# Patient Record
Sex: MALE | Race: WHITE | NOT HISPANIC OR LATINO
[De-identification: names, ages, dates, MRNs, and addresses within clinical notes are randomized per-mention and may not be internally consistent; named-entity substitution may affect disease eponyms.]

---

## 2018-02-08 PROBLEM — Z00.00 ENCOUNTER FOR PREVENTIVE HEALTH EXAMINATION: Status: ACTIVE | Noted: 2018-02-08

## 2018-02-23 ENCOUNTER — APPOINTMENT (OUTPATIENT)
Dept: HEART AND VASCULAR | Facility: CLINIC | Age: 28
End: 2018-02-23
Payer: COMMERCIAL

## 2018-02-23 DIAGNOSIS — M25.639 STIFFNESS OF UNSPECIFIED WRIST, NOT ELSEWHERE CLASSIFIED: ICD-10-CM

## 2018-02-23 PROCEDURE — 99204 OFFICE O/P NEW MOD 45 MIN: CPT

## 2018-02-26 PROBLEM — M25.639 DECREASED ROM OF WRIST: Status: ACTIVE | Noted: 2018-02-26

## 2018-04-03 VITALS
DIASTOLIC BLOOD PRESSURE: 89 MMHG | HEIGHT: 69 IN | RESPIRATION RATE: 16 BRPM | SYSTOLIC BLOOD PRESSURE: 142 MMHG | OXYGEN SATURATION: 98 % | WEIGHT: 175.05 LBS | TEMPERATURE: 98 F

## 2018-04-03 NOTE — H&P ADULT - ASSESSMENT
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 Mckee at [facility] in 1992 with multiple episodes of recurrent cellulitis, most recently in 12/2017 and was referred to Dr. Coleman for further evaluation.    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         ASA: III and Mallampati: III      - IVF Hydration NS @ 75cc/hr.

## 2018-04-03 NOTE — H&P ADULT - HISTORY OF PRESENT ILLNESS
***HISTORY OBTAINED FROM PATIENT AND DR. 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 Mckee at [facility] in 1992 with multiple episodes of recurrent cellulitis, most recently in 12/2017 and was referred to Dr. Braden for further evaluation.  Pt states he is left handed and was doing reasonably well for number of years but over the last year he has had multiple episodes of cellulitis requiring hospitalization with IV antibiotics. He had a recent MRI performed at [facility] (results pending).  Pt reports "mild" chronic pain R arm pain, diffuse swelling of the forearm extending to the wrist, and some limitation of flexion of the wrist.    When seen in the office pt was found to have extensive scarring from previous surgical resections, but no overlying skin lesions or birthmarks.  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 ***HISTORY OBTAINED FROM PATIENT AND DR. 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 Mckee at [facility] in 1992 with multiple episodes of recurrent cellulitis, most recently in 12/2017 and was referred to Dr. Braden for further evaluation.  Pt states he has been doing reasonably well for number of years, however over the last year he has had multiple episodes of cellulitis requiring hospitalization with IV antibiotics. He had a recent MRI performed at [facility] (results pending).  Pt reports "mild" chronic pain R arm pain, diffuse swelling of the forearm extending to the wrist, and some limitation of flexion of the wrist.    When seen in the office pt was found to have extensive scarring from previous surgical resections, but no overlying skin lesions or birthmarks.  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 ***HISTORY OBTAINED FROM PATIENT AND DR. 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 Mckee at [facility] in 1992 with multiple episodes of recurrent cellulitis, most recently in 12/2017 and was referred to Dr. Braden for further evaluation.  Pt states he has been doing reasonably well for number of years, however over the last year he has had multiple episodes of cellulitis requiring hospitalization with IV antibiotics. He had a recent MRI performed at [facility] (results pending).  Pt reports "mild" chronic pain R arm pain, diffuse swelling of the forearm extending to the wrist, and some limitation of flexion of the wrist.    When seen in the office pt was found to have extensive scarring from previous surgical resections, but no overlying skin lesions or birthmarks.  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

## 2018-04-03 NOTE — H&P ADULT - EXTREMITIES COMMENTS
+ Diffuse swelling of RUE extending to the hand. N/V intact.  Scar from prior surgeries.  Overlying skin remains intact. + Diffuse swelling of RUE extending to the hand. N/V intact.  Scarring from prior surgeries.  Overlying skin remains intact.

## 2018-04-05 ENCOUNTER — INPATIENT (INPATIENT)
Facility: HOSPITAL | Age: 28
LOS: 0 days | Discharge: HOME | DRG: 804 | End: 2018-04-05
Attending: RADIOLOGY | Admitting: RADIOLOGY
Payer: COMMERCIAL

## 2018-04-05 ENCOUNTER — TRANSCRIPTION ENCOUNTER (OUTPATIENT)
Age: 28
End: 2018-04-05

## 2018-04-05 VITALS — TEMPERATURE: 98 F

## 2018-04-05 LAB
ANION GAP SERPL CALC-SCNC: 15 MMOL/L — SIGNIFICANT CHANGE UP (ref 5–17)
APTT BLD: 36 SEC — SIGNIFICANT CHANGE UP (ref 27.5–37.4)
BASOPHILS NFR BLD AUTO: 0.8 % — SIGNIFICANT CHANGE UP (ref 0–2)
BUN SERPL-MCNC: 14 MG/DL — SIGNIFICANT CHANGE UP (ref 7–23)
CALCIUM SERPL-MCNC: 9.5 MG/DL — SIGNIFICANT CHANGE UP (ref 8.4–10.5)
CHLORIDE SERPL-SCNC: 103 MMOL/L — SIGNIFICANT CHANGE UP (ref 96–108)
CO2 SERPL-SCNC: 23 MMOL/L — SIGNIFICANT CHANGE UP (ref 22–31)
CREAT SERPL-MCNC: 0.75 MG/DL — SIGNIFICANT CHANGE UP (ref 0.5–1.3)
EOSINOPHIL NFR BLD AUTO: 1.3 % — SIGNIFICANT CHANGE UP (ref 0–6)
GLUCOSE SERPL-MCNC: 98 MG/DL — SIGNIFICANT CHANGE UP (ref 70–99)
HCT VFR BLD CALC: 47.2 % — SIGNIFICANT CHANGE UP (ref 39–50)
HGB BLD-MCNC: 16.5 G/DL — SIGNIFICANT CHANGE UP (ref 13–17)
INR BLD: 1.05 — SIGNIFICANT CHANGE UP (ref 0.88–1.16)
LYMPHOCYTES # BLD AUTO: 40.7 % — SIGNIFICANT CHANGE UP (ref 13–44)
MCHC RBC-ENTMCNC: 31 PG — SIGNIFICANT CHANGE UP (ref 27–34)
MCHC RBC-ENTMCNC: 35 G/DL — SIGNIFICANT CHANGE UP (ref 32–36)
MCV RBC AUTO: 88.7 FL — SIGNIFICANT CHANGE UP (ref 80–100)
MONOCYTES NFR BLD AUTO: 12.3 % — SIGNIFICANT CHANGE UP (ref 2–14)
NEUTROPHILS NFR BLD AUTO: 44.9 % — SIGNIFICANT CHANGE UP (ref 43–77)
PLATELET # BLD AUTO: 202 K/UL — SIGNIFICANT CHANGE UP (ref 150–400)
POTASSIUM SERPL-MCNC: 4.2 MMOL/L — SIGNIFICANT CHANGE UP (ref 3.5–5.3)
POTASSIUM SERPL-SCNC: 4.2 MMOL/L — SIGNIFICANT CHANGE UP (ref 3.5–5.3)
PROTHROM AB SERPL-ACNC: 11.7 SEC — SIGNIFICANT CHANGE UP (ref 9.8–12.7)
RBC # BLD: 5.32 M/UL — SIGNIFICANT CHANGE UP (ref 4.2–5.8)
RBC # FLD: 11.9 % — SIGNIFICANT CHANGE UP (ref 10.3–16.9)
SODIUM SERPL-SCNC: 141 MMOL/L — SIGNIFICANT CHANGE UP (ref 135–145)
WBC # BLD: 6 K/UL — SIGNIFICANT CHANGE UP (ref 3.8–10.5)
WBC # FLD AUTO: 6 K/UL — SIGNIFICANT CHANGE UP (ref 3.8–10.5)

## 2018-04-05 PROCEDURE — 76937 US GUIDE VASCULAR ACCESS: CPT | Mod: 26

## 2018-04-05 PROCEDURE — 85025 COMPLETE CBC W/AUTO DIFF WBC: CPT

## 2018-04-05 PROCEDURE — 93005 ELECTROCARDIOGRAM TRACING: CPT

## 2018-04-05 PROCEDURE — 85610 PROTHROMBIN TIME: CPT

## 2018-04-05 PROCEDURE — 93010 ELECTROCARDIOGRAM REPORT: CPT

## 2018-04-05 PROCEDURE — 80048 BASIC METABOLIC PNL TOTAL CA: CPT

## 2018-04-05 PROCEDURE — C1889: CPT

## 2018-04-05 PROCEDURE — 85730 THROMBOPLASTIN TIME PARTIAL: CPT

## 2018-04-05 PROCEDURE — 37244 VASC EMBOLIZE/OCCLUDE BLEED: CPT

## 2018-04-05 RX ORDER — CEFAZOLIN SODIUM 1 G
1000 VIAL (EA) INJECTION EVERY 8 HOURS
Qty: 0 | Refills: 0 | Status: DISCONTINUED | OUTPATIENT
Start: 2018-04-05 | End: 2018-04-05

## 2018-04-05 RX ORDER — ONDANSETRON 8 MG/1
4 TABLET, FILM COATED ORAL EVERY 4 HOURS
Qty: 0 | Refills: 0 | Status: DISCONTINUED | OUTPATIENT
Start: 2018-04-05 | End: 2018-04-05

## 2018-04-05 RX ORDER — SODIUM CHLORIDE 9 MG/ML
500 INJECTION INTRAMUSCULAR; INTRAVENOUS; SUBCUTANEOUS
Qty: 0 | Refills: 0 | Status: DISCONTINUED | OUTPATIENT
Start: 2018-04-05 | End: 2018-04-05

## 2018-04-05 RX ORDER — MORPHINE SULFATE 50 MG/1
4 CAPSULE, EXTENDED RELEASE ORAL EVERY 4 HOURS
Qty: 0 | Refills: 0 | Status: DISCONTINUED | OUTPATIENT
Start: 2018-04-05 | End: 2018-04-05

## 2018-04-05 RX ORDER — CEPHALEXIN 500 MG
1 CAPSULE ORAL
Qty: 28 | Refills: 0 | OUTPATIENT
Start: 2018-04-05 | End: 2018-04-11

## 2018-04-05 RX ADMIN — Medication 100 MILLIGRAM(S): at 18:14

## 2018-04-05 RX ADMIN — SODIUM CHLORIDE 75 MILLILITER(S): 9 INJECTION INTRAMUSCULAR; INTRAVENOUS; SUBCUTANEOUS at 14:33

## 2018-04-05 RX ADMIN — Medication 130 MILLIGRAM(S): at 14:33

## 2018-04-05 NOTE — DISCHARGE NOTE ADULT - CARE PROVIDER_API CALL
Rodrigo Coleman (MD), Diagnostic Radiology  [address]  [address] [place] [address]  Phone: [phone]  Fax: [phone]

## 2018-04-05 NOTE — CHART NOTE - NSCHARTNOTEFT_GEN_A_CORE
Brief Operative Report  ONELIA LEE, 27y, Male  MRN:  [id number]    Procedure Date:  4/5/2018    Primary Surgeon:  Jared  Co-Surgeon(s):    Assistants:  Cesario Epps    Type of Anesthesia:  LMA anesthesia    Elective Procedure?  Y/N:  Yes    Pre-op Diagnosis:  Lymphatic malformation of R arm  Post-op Diagnosis:  Lymphatic malformation of R arm    Procedure:    - Direct stick embolization of lymphatic malformation of R distal forearm    Operative Findings:    - Direct stick study of lateral and medial aspects of R wrist and distal forearm  - demonstrates diffuse macro and microcystic lymphatic malformation extending across wrist bw distal radius and dontae  - DSE performed with 170 mg of doxycycline  - entry tracts occluded with Surgiflo collagen matrix    Specimens:  none   Drains:  none    EBL:  minimal    Antibiotic Protocol:  Followed Protocol:      Comments/Other Details:    - none    Condition Post-op:  stable  Disposition:  recovery room

## 2018-04-05 NOTE — DISCHARGE NOTE ADULT - PLAN OF CARE
You had a direct stick embolization for your right arm. Take Keflex (Cephalexin) 500mg four times a day for 7 days. This is an antibiotic to avoid infection.  Follow up with Dr. Coleman in 6 weeks. Continue home medications for ADD.

## 2018-04-05 NOTE — DISCHARGE NOTE ADULT - MEDICATION SUMMARY - MEDICATIONS TO TAKE
I will START or STAY ON the medications listed below when I get home from the hospital:    Xanax 0.5 mg oral tablet  -- 1 tab(s) by mouth 3 times a day, As Needed  -- Indication: For Anxiety    Adderall XR 15 mg oral capsule, extended release  -- 1 cap(s) by mouth once a day (in the morning), As Needed  -- Indication: For Anxiety I will START or STAY ON the medications listed below when I get home from the hospital:    Xanax 0.5 mg oral tablet  -- 1 tab(s) by mouth 3 times a day, As Needed  -- Indication: For Anxiety    Keflex 500 mg oral capsule  -- 1 cap(s) by mouth every 6 hours   -- Finish all this medication unless otherwise directed by prescriber.    -- Indication: For Antibiotics    Adderall XR 15 mg oral capsule, extended release  -- 1 cap(s) by mouth once a day (in the morning), As Needed  -- Indication: For Anxiety

## 2018-04-05 NOTE — DISCHARGE NOTE ADULT - HOSPITAL COURSE
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 Mckee at [facility] in 1992 with multiple episodes of recurrent cellulitis, most recently in 12/2017 and was referred to Dr. Coleman for further evaluation.  Pt states he has been doing reasonably well for number of years, however over the last year he has had multiple episodes of cellulitis requiring hospitalization with IV antibiotics. He had a recent MRI performed at [facility] (results pending).  Pt reports "mild" chronic pain R arm pain, diffuse swelling of the forearm extending to the wrist, and some limitation of flexion of the wrist.  Pt now s/p right upper arm lymphatic malformation embolization. Pt stable for discharge as per Dr. Coleman. Pt will go on 1 week of Keflex 500mg four times a day. Prescription sent to his pharmacy. Pt will follow up with Dr. Coleman in 6 weeks.

## 2018-04-05 NOTE — DISCHARGE NOTE ADULT - PATIENT PORTAL LINK FT
You can access the [facility] Patient Portal, offered by [facility], by registering with the following website: [email or web address]

## 2018-04-05 NOTE — DISCHARGE NOTE ADULT - CARE PLAN
care per cardiology Principal Discharge DX:	Lymphatic malformation  Goal:	You had a direct stick embolization for your right arm.  Assessment and plan of treatment:	Take Keflex (Cephalexin) 500mg four times a day for 7 days. This is an antibiotic to avoid infection.  Follow up with Dr. Coleman in 6 weeks.  Secondary Diagnosis:	ADD (attention deficit disorder)  Goal:	Continue home medications for ADD.

## 2018-04-10 DIAGNOSIS — F98.8 OTHER SPECIFIED BEHAVIORAL AND EMOTIONAL DISORDERS WITH ONSET USUALLY OCCURRING IN CHILDHOOD AND ADOLESCENCE: ICD-10-CM

## 2018-04-10 DIAGNOSIS — I89.9 NONINFECTIVE DISORDER OF LYMPHATIC VESSELS AND LYMPH NODES, UNSPECIFIED: ICD-10-CM

## 2018-06-21 PROBLEM — F98.8 OTHER SPECIFIED BEHAVIORAL AND EMOTIONAL DISORDERS WITH ONSET USUALLY OCCURRING IN CHILDHOOD AND ADOLESCENCE: Chronic | Status: ACTIVE | Noted: 2018-04-03

## 2018-06-21 PROBLEM — Q89.9 CONGENITAL MALFORMATION, UNSPECIFIED: Chronic | Status: ACTIVE | Noted: 2018-04-03

## 2018-06-22 ENCOUNTER — APPOINTMENT (OUTPATIENT)
Dept: HEART AND VASCULAR | Facility: CLINIC | Age: 28
End: 2018-06-22
Payer: COMMERCIAL

## 2018-06-22 PROCEDURE — 76882 US LMTD JT/FCL EVL NVASC XTR: CPT

## 2018-06-22 PROCEDURE — 99214 OFFICE O/P EST MOD 30 MIN: CPT | Mod: 25

## 2019-02-11 ENCOUNTER — APPOINTMENT (OUTPATIENT)
Dept: HEART AND VASCULAR | Facility: CLINIC | Age: 29
End: 2019-02-11
Payer: COMMERCIAL

## 2019-02-11 PROCEDURE — 76882 US LMTD JT/FCL EVL NVASC XTR: CPT

## 2019-02-11 PROCEDURE — 99213 OFFICE O/P EST LOW 20 MIN: CPT | Mod: 25

## 2019-02-13 NOTE — PHYSICAL EXAM
[Alert] : alert [No Acute Distress] : no acute distress [PERRL] : pupils equal, round and reactive to light [Normal Hearing] : hearing was normal [No Neck Mass] : no neck mass was observed [No Respiratory Distress] : no respiratory distress [Normal Rate] : heart rate was normal  [Regular Rhythm] : with a regular rhythm [Not Tender] : non-tender [Not Distended] : not distended [Normal Gait] : normal gait [No Rash] : no rash [No Skin Lesions] : no skin lesions [No Motor Deficits] : the motor exam was normal [No Sensory Deficits] : the sensory exam was normal to light touch and pinprick [Oriented x3] : oriented to person, place, and time [de-identified] : normal radial pulse bilaterally, swelling of right forearm [de-identified] : decreased ROM of left wrist

## 2019-02-13 NOTE — ASSESSMENT
[FreeTextEntry1] : This is a 28-year-old male with a known fairly extensive primarily  lymphatic malformation in his right wrist and forearm. He has had multiple surgeries and treatments and we saw him for the first time about a year ago for frequent recurrent cellulitis of the distal forearm and wrist. We treated the area with sclerotherapy he has done well with no inflammatory episodes since. He said he was working out in the gym last week and when he got home he noticed a firm lump over the volar aspect of the mid forearm. It is not painful and has had no fever or any inflammatory changes. On physical exam there is an approximately 1 cm slightly bluish firm subcutaneous lump which is nontender. I did an ultrasound in the office and it appears to be a partially thrombosed vein. I told him that often these lymphatic malformations are mixed with venous elements and it appeared he had a localized area of spontaneous thrombosis. I told him it probably would resolve on its own and if it wasn't causing any symptoms it would not be worth treating in any event. I told him if he had any questions or there were any changes he should come back for a followup visit.

## 2019-04-01 ENCOUNTER — APPOINTMENT (OUTPATIENT)
Dept: HEART AND VASCULAR | Facility: CLINIC | Age: 29
End: 2019-04-01

## 2019-04-05 ENCOUNTER — APPOINTMENT (OUTPATIENT)
Dept: HEART AND VASCULAR | Facility: CLINIC | Age: 29
End: 2019-04-05
Payer: COMMERCIAL

## 2019-04-05 PROCEDURE — 99214 OFFICE O/P EST MOD 30 MIN: CPT | Mod: 25

## 2019-04-05 PROCEDURE — 76882 US LMTD JT/FCL EVL NVASC XTR: CPT

## 2019-04-09 NOTE — ASSESSMENT
[FreeTextEntry1] : This is a 28-year-old male with a diffuse lymphatic malformation involving the right distal forearm and wrist. He had surgery years ago and more recently had been having multiple episodes of swelling, pain and cellulitis requiring IV antibiotics. We've done one direct sclerotherapy treatment using doxycycline approximately one year ago. He's been doing fairly well with no inflammatory or infectious episodes. He does note that over the last couple of months the wrist and forearm seem more tensely swollen but not frankly painful. On physical exam he has scarring from previous surgery and there is a fair amount of tense swelling especially in the volar aspect of the wrist. Neuromuscular function is intact in the hand. I did an ultrasound in the office confirming significant residual macrocystic malformation. He is scheduled to travel to [facility] for 10 days nert week and he will wait until he returns to have a procedure, which I do recommend. We also gave him a prescription for prophylactic antibiotics in case there were any issues while he was traveling.

## 2019-04-09 NOTE — PHYSICAL EXAM
[Alert] : alert [PERRL] : pupils equal, round and reactive to light [No Acute Distress] : no acute distress [No Neck Mass] : no neck mass was observed [Normal Hearing] : hearing was normal [No Respiratory Distress] : no respiratory distress [Normal Rate] : heart rate was normal  [Not Tender] : non-tender [Not Distended] : not distended [Regular Rhythm] : with a regular rhythm [Normal Gait] : normal gait [No Rash] : no rash [No Motor Deficits] : the motor exam was normal [No Skin Lesions] : no skin lesions [No Sensory Deficits] : the sensory exam was normal to light touch and pinprick [Oriented x3] : oriented to person, place, and time [de-identified] : normal radial pulse bilaterally, swelling of right wrist [de-identified] : decreased ROM of left wrist

## 2019-04-24 ENCOUNTER — TRANSCRIPTION ENCOUNTER (OUTPATIENT)
Age: 29
End: 2019-04-24

## 2019-06-24 VITALS
TEMPERATURE: 97 F | DIASTOLIC BLOOD PRESSURE: 82 MMHG | SYSTOLIC BLOOD PRESSURE: 136 MMHG | HEART RATE: 62 BPM | RESPIRATION RATE: 16 BRPM | OXYGEN SATURATION: 96 %

## 2019-06-24 RX ORDER — CHLORHEXIDINE GLUCONATE 213 G/1000ML
1 SOLUTION TOPICAL ONCE
Refills: 0 | Status: DISCONTINUED | OUTPATIENT
Start: 2019-06-25 | End: 2019-06-25

## 2019-06-24 RX ORDER — DEXTROAMPHETAMINE SACCHARATE, AMPHETAMINE ASPARTATE, DEXTROAMPHETAMINE SULFATE AND AMPHETAMINE SULFATE 1.875; 1.875; 1.875; 1.875 MG/1; MG/1; MG/1; MG/1
1 TABLET ORAL
Qty: 0 | Refills: 0 | DISCHARGE

## 2019-06-24 NOTE — H&P ADULT - NSHPLABSRESULTS_GEN_ALL_CORE
15.3   6.55  )-----------( 214      ( 25 Jun 2019 07:47 )             46.5       06-25    142  |  105  |  17  ----------------------------<  105<H>  4.5   |  27  |  0.86    Ca    9.2      25 Jun 2019 07:47    TPro  7.7  /  Alb  4.6  /  TBili  0.3  /  DBili  x   /  AST  22  /  ALT  29  /  AlkPhos  88  06-25      PT/INR - ( 25 Jun 2019 07:47 )   PT: 10.9 sec;   INR: 0.97          PTT - ( 25 Jun 2019 07:47 )  PTT:29.6 sec              EKG: SB at 56 bpm. 0.5 mm ST elevation V6. 1 mm St elevation I. P wave inversion III. TWI III.

## 2019-06-24 NOTE — H&P ADULT - EXTREMITIES COMMENTS
No clubbing, cyanosis or edema BLE. RUE edematous with multiple well healed scars. No ulcerations or skin breakdown.

## 2019-06-24 NOTE — H&P ADULT - ASSESSMENT
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 Mckee at [facility] in 1992 with multiple episodes of recurrent cellulitis, s/p DSE of  the right lymphatic malformations with doxycycline 3/18 who presents for DSE of Extensive Right Lymphatic Malformation under general anesthesia.    ASA III                      Mallampati III       Risks & benefits of procedure and alternative therapy have been explained to the patient including but not limited to: allergic reaction, bleeding w/possible need for blood transfusion, infection, renal and vascular compromise, limb damage, emergent surgery. Informed consent obtained and in chart.

## 2019-06-24 NOTE — H&P ADULT - HISTORY OF PRESENT ILLNESS
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 Mckee at [facility] in 1992 with multiple episodes of recurrent cellulitis, s/p SOSA of  the right lymphatic malformations with doxycycline 3/18 presented to  his doctor c/o right hand swelling and decreased range of motion.     Pt states he has been doing reasonably well for number of years, however over the last year he has had multiple episodes of cellulitis requiring hospitalization with IV antibiotics. He had a recent MRI performed at [facility] (results pending).  Pt reports "mild" chronic pain R arm pain, diffuse swelling of the forearm extending to the wrist, and some limitation of flexion of the wrist.    When seen in the office pt was found to have extensive scarring from previous surgical resections, but no overlying skin lesions or birthmarks.  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 Mckee at [facility] in 1992 with multiple episodes of recurrent cellulitis, s/p SOSA of  the right lymphatic malformations with doxycycline 3/18 presented to  his doctor c/o right hand swelling and decreased range of motion. After the last procedure pt did not have episodes of cellulitis and infections, has not been hospitalized since then. Pt reports diffuse swelling of the forearm extending to the wrist, and some limitation of flexion of the wrist.    When seen in the office pt was found to have extensive scarring from previous surgical resections, but no overlying skin lesions or birthmarks.  Ultrasound done in the office confirmed numerous cystic spaces.     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 Coleman. 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 Mckee at [facility] in 1992 with multiple episodes of recurrent cellulitis, s/p DSE of  the right lymphatic malformations with doxycycline 3/18 presented to  his doctor c/o right hand swelling and decreased range of motion. After the last procedure pt did not have episodes of cellulitis and infections, has not been hospitalized since then. Pt reports diffuse swelling of the forearm extending to the wrist, and some limitation of flexion of the wrist.    When seen in the office pt was found to have extensive scarring from previous surgical resections, but no overlying skin lesions or birthmarks.  Ultrasound done in the office confirmed numerous cystic spaces.     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 Coleman.

## 2019-06-25 ENCOUNTER — OUTPATIENT (OUTPATIENT)
Dept: OUTPATIENT SERVICES | Facility: HOSPITAL | Age: 29
LOS: 1 days | Discharge: NURSING, REHAB OR LONG-TERM CARE | End: 2019-06-25
Payer: COMMERCIAL

## 2019-06-25 LAB
ALBUMIN SERPL ELPH-MCNC: 4.6 G/DL — SIGNIFICANT CHANGE UP (ref 3.3–5)
ALP SERPL-CCNC: 88 U/L — SIGNIFICANT CHANGE UP (ref 40–120)
ALT FLD-CCNC: 29 U/L — SIGNIFICANT CHANGE UP (ref 10–45)
ANION GAP SERPL CALC-SCNC: 10 MMOL/L — SIGNIFICANT CHANGE UP (ref 5–17)
APTT BLD: 29.6 SEC — SIGNIFICANT CHANGE UP (ref 27.5–36.3)
AST SERPL-CCNC: 22 U/L — SIGNIFICANT CHANGE UP (ref 10–40)
BASOPHILS # BLD AUTO: 0.05 K/UL — SIGNIFICANT CHANGE UP (ref 0–0.2)
BASOPHILS NFR BLD AUTO: 0.8 % — SIGNIFICANT CHANGE UP (ref 0–2)
BILIRUB SERPL-MCNC: 0.3 MG/DL — SIGNIFICANT CHANGE UP (ref 0.2–1.2)
BUN SERPL-MCNC: 17 MG/DL — SIGNIFICANT CHANGE UP (ref 7–23)
CALCIUM SERPL-MCNC: 9.2 MG/DL — SIGNIFICANT CHANGE UP (ref 8.4–10.5)
CHLORIDE SERPL-SCNC: 105 MMOL/L — SIGNIFICANT CHANGE UP (ref 96–108)
CO2 SERPL-SCNC: 27 MMOL/L — SIGNIFICANT CHANGE UP (ref 22–31)
CREAT SERPL-MCNC: 0.86 MG/DL — SIGNIFICANT CHANGE UP (ref 0.5–1.3)
EOSINOPHIL # BLD AUTO: 0.29 K/UL — SIGNIFICANT CHANGE UP (ref 0–0.5)
EOSINOPHIL NFR BLD AUTO: 4.4 % — SIGNIFICANT CHANGE UP (ref 0–6)
GLUCOSE SERPL-MCNC: 105 MG/DL — HIGH (ref 70–99)
HCT VFR BLD CALC: 46.5 % — SIGNIFICANT CHANGE UP (ref 39–50)
HGB BLD-MCNC: 15.3 G/DL — SIGNIFICANT CHANGE UP (ref 13–17)
IMM GRANULOCYTES NFR BLD AUTO: 0.8 % — SIGNIFICANT CHANGE UP (ref 0–1.5)
INR BLD: 0.97 — SIGNIFICANT CHANGE UP (ref 0.88–1.16)
LYMPHOCYTES # BLD AUTO: 2.2 K/UL — SIGNIFICANT CHANGE UP (ref 1–3.3)
LYMPHOCYTES # BLD AUTO: 33.6 % — SIGNIFICANT CHANGE UP (ref 13–44)
MCHC RBC-ENTMCNC: 30 PG — SIGNIFICANT CHANGE UP (ref 27–34)
MCHC RBC-ENTMCNC: 32.9 GM/DL — SIGNIFICANT CHANGE UP (ref 32–36)
MCV RBC AUTO: 91.2 FL — SIGNIFICANT CHANGE UP (ref 80–100)
MONOCYTES # BLD AUTO: 0.89 K/UL — SIGNIFICANT CHANGE UP (ref 0–0.9)
MONOCYTES NFR BLD AUTO: 13.6 % — SIGNIFICANT CHANGE UP (ref 2–14)
NEUTROPHILS # BLD AUTO: 3.07 K/UL — SIGNIFICANT CHANGE UP (ref 1.8–7.4)
NEUTROPHILS NFR BLD AUTO: 46.8 % — SIGNIFICANT CHANGE UP (ref 43–77)
NRBC # BLD: 0 /100 WBCS — SIGNIFICANT CHANGE UP (ref 0–0)
PLATELET # BLD AUTO: 214 K/UL — SIGNIFICANT CHANGE UP (ref 150–400)
POTASSIUM SERPL-MCNC: 4.5 MMOL/L — SIGNIFICANT CHANGE UP (ref 3.5–5.3)
POTASSIUM SERPL-SCNC: 4.5 MMOL/L — SIGNIFICANT CHANGE UP (ref 3.5–5.3)
PROT SERPL-MCNC: 7.7 G/DL — SIGNIFICANT CHANGE UP (ref 6–8.3)
PROTHROM AB SERPL-ACNC: 10.9 SEC — SIGNIFICANT CHANGE UP (ref 10–12.9)
RBC # BLD: 5.1 M/UL — SIGNIFICANT CHANGE UP (ref 4.2–5.8)
RBC # FLD: 11.5 % — SIGNIFICANT CHANGE UP (ref 10.3–14.5)
SODIUM SERPL-SCNC: 142 MMOL/L — SIGNIFICANT CHANGE UP (ref 135–145)
WBC # BLD: 6.55 K/UL — SIGNIFICANT CHANGE UP (ref 3.8–10.5)
WBC # FLD AUTO: 6.55 K/UL — SIGNIFICANT CHANGE UP (ref 3.8–10.5)

## 2019-06-25 PROCEDURE — 93005 ELECTROCARDIOGRAM TRACING: CPT

## 2019-06-25 PROCEDURE — 37241 VASC EMBOLIZE/OCCLUDE VENOUS: CPT

## 2019-06-25 PROCEDURE — C1889: CPT

## 2019-06-25 PROCEDURE — 93010 ELECTROCARDIOGRAM REPORT: CPT

## 2019-06-25 PROCEDURE — 85610 PROTHROMBIN TIME: CPT

## 2019-06-25 PROCEDURE — 85025 COMPLETE CBC W/AUTO DIFF WBC: CPT

## 2019-06-25 PROCEDURE — 85730 THROMBOPLASTIN TIME PARTIAL: CPT

## 2019-06-25 PROCEDURE — 80053 COMPREHEN METABOLIC PANEL: CPT

## 2019-06-25 RX ORDER — SODIUM CHLORIDE 9 MG/ML
500 INJECTION INTRAMUSCULAR; INTRAVENOUS; SUBCUTANEOUS
Refills: 0 | Status: DISCONTINUED | OUTPATIENT
Start: 2019-06-25 | End: 2019-06-25

## 2019-06-25 RX ORDER — DEXTROAMPHETAMINE SACCHARATE, AMPHETAMINE ASPARTATE, DEXTROAMPHETAMINE SULFATE AND AMPHETAMINE SULFATE 1.875; 1.875; 1.875; 1.875 MG/1; MG/1; MG/1; MG/1
1 TABLET ORAL
Qty: 0 | Refills: 0 | DISCHARGE

## 2019-06-25 RX ORDER — MORPHINE SULFATE 50 MG/1
4 CAPSULE, EXTENDED RELEASE ORAL EVERY 4 HOURS
Refills: 0 | Status: DISCONTINUED | OUTPATIENT
Start: 2019-06-25 | End: 2019-06-25

## 2019-06-25 RX ORDER — ALPRAZOLAM 0.25 MG
1 TABLET ORAL
Qty: 0 | Refills: 0 | DISCHARGE

## 2019-06-25 RX ORDER — ONDANSETRON 8 MG/1
4 TABLET, FILM COATED ORAL EVERY 4 HOURS
Refills: 0 | Status: DISCONTINUED | OUTPATIENT
Start: 2019-06-25 | End: 2019-06-25

## 2019-06-25 RX ORDER — CEPHALEXIN 500 MG
1 CAPSULE ORAL
Qty: 28 | Refills: 0
Start: 2019-06-25 | End: 2019-07-01

## 2019-06-25 RX ADMIN — SODIUM CHLORIDE 75 MILLILITER(S): 9 INJECTION INTRAMUSCULAR; INTRAVENOUS; SUBCUTANEOUS at 10:37

## 2019-06-25 RX ADMIN — MORPHINE SULFATE 4 MILLIGRAM(S): 50 CAPSULE, EXTENDED RELEASE ORAL at 10:49

## 2019-06-25 NOTE — BRIEF OPERATIVE NOTE - OPERATION/FINDINGS
-direct stick study shows diffuse lymphatic malformation of the right forearm and wrist  -DSE performed using 140mg doxycycline

## 2019-08-07 NOTE — H&P ADULT - NS NEC GEN PE MLT EXAM PC
Bedside XR done, noted femur fracture, additional images ordered and trauma consulted. Spoke with trauma team, requesting CT chest and A/P additionally, pt wheeled to CT just after XR. Morphine give with some pan relief. Will consult ortho. Trauma to evaluate. Ortho aware, spoke with Dr. Gama who will evaluate the pt. detailed exam

## 2019-12-18 RX ORDER — CEPHALEXIN 500 MG
1 CAPSULE ORAL
Qty: 28 | Refills: 0
Start: 2019-12-18 | End: 2019-12-24

## 2020-03-09 ENCOUNTER — APPOINTMENT (OUTPATIENT)
Dept: HEART AND VASCULAR | Facility: CLINIC | Age: 30
End: 2020-03-09
Payer: COMMERCIAL

## 2020-03-09 DIAGNOSIS — M79.89 OTHER SPECIFIED SOFT TISSUE DISORDERS: ICD-10-CM

## 2020-03-09 DIAGNOSIS — Q89.9 CONGENITAL MALFORMATION, UNSPECIFIED: ICD-10-CM

## 2020-03-09 PROCEDURE — 99214 OFFICE O/P EST MOD 30 MIN: CPT

## 2020-03-18 ENCOUNTER — TRANSCRIPTION ENCOUNTER (OUTPATIENT)
Age: 30
End: 2020-03-18

## 2020-03-24 PROBLEM — M79.89 ARM SWELLING: Status: ACTIVE | Noted: 2018-02-26

## 2020-03-24 PROBLEM — Q89.9 LYMPHATIC MALFORMATION: Status: ACTIVE | Noted: 2018-02-26

## 2020-03-24 NOTE — ASSESSMENT
[FreeTextEntry1] : This is a 30-year-old male who we have treated twice previously for an extensive lymphatic malformation involving his right wrist and proximal hand. He has had numerous procedures over the years including surgical debulking with extensive scarring. He only uses compression when he works out. We have treated him twice, primarily for recurrent cellulitis, the last time 9 months ago. He has done well with no episodes of cellulitis since last year. He comes in today complaining of increased swelling and discomfort in the volar right wrist. He is left handed. On physical exam he has fusiform tense swelling of the wrist extending into the thenar and hypothenar area. He actually has fairly good range of motion and  in that hand. We know from prior studies that he has a multicystic type lesion. There are some issues with his insurance and we will try to work on that but he would like to ahead with further drainage and sclerotherapy.\par

## 2020-03-24 NOTE — PHYSICAL EXAM
[Alert] : alert [No Acute Distress] : no acute distress [PERRL] : pupils equal, round and reactive to light [Normal Hearing] : hearing was normal [No Neck Mass] : no neck mass was observed [No Respiratory Distress] : no respiratory distress [Normal Rate] : heart rate was normal  [Regular Rhythm] : with a regular rhythm [Not Tender] : non-tender [Not Distended] : not distended [Normal Gait] : normal gait [No Rash] : no rash [No Skin Lesions] : no skin lesions [No Motor Deficits] : the motor exam was normal [No Sensory Deficits] : the sensory exam was normal to light touch and pinprick [Oriented x3] : oriented to person, place, and time [de-identified] : normal radial pulse bilaterally, swelling of right wrist [de-identified] : decreased ROM of left wrist

## 2020-06-29 NOTE — H&P ADULT - CARDIOVASCULAR COMMENTS
----- Message from Danny Castaneda sent at 6/29/2020 12:04 PM CDT -----  Regarding: refills  Lantus   [facility]   Pt [phone]     No murmur. Regular rhythm

## 2020-09-10 NOTE — BRIEF OPERATIVE NOTE - SPECIMENS
Patient discharged to [facility]. Report given to Wendy. Daughter of patient @ bedside. Patient dischargedin stable condition.    none

## 2021-10-25 NOTE — H&P ADULT - NSHPPOAPULMEMBOLUS_GEN_A_CORE
Routed to Dr. Jordan. Call from Dr Askew from short-term disability requesting peer to peer [phone]. ./LR   no

## 2022-06-03 NOTE — H&P ADULT - ENMT
Medical Necessity Clause: This procedure was medically necessary because the lesions that were treated were: No oral lesions; no gross abnormalities